# Patient Record
Sex: FEMALE | Race: WHITE | NOT HISPANIC OR LATINO
[De-identification: names, ages, dates, MRNs, and addresses within clinical notes are randomized per-mention and may not be internally consistent; named-entity substitution may affect disease eponyms.]

---

## 2019-05-30 PROBLEM — Z00.00 ENCOUNTER FOR PREVENTIVE HEALTH EXAMINATION: Status: ACTIVE | Noted: 2019-05-30

## 2019-06-12 ENCOUNTER — APPOINTMENT (OUTPATIENT)
Dept: NEUROLOGY | Facility: CLINIC | Age: 69
End: 2019-06-12

## 2020-10-21 ENCOUNTER — APPOINTMENT (OUTPATIENT)
Dept: VASCULAR SURGERY | Facility: CLINIC | Age: 70
End: 2020-10-21

## 2022-09-10 ENCOUNTER — APPOINTMENT (OUTPATIENT)
Dept: AFTER HOURS CARE | Facility: EMERGENCY ROOM | Age: 72
End: 2022-09-10

## 2022-09-10 DIAGNOSIS — R51.9 HEADACHE, UNSPECIFIED: ICD-10-CM

## 2022-09-10 PROCEDURE — 99204 OFFICE O/P NEW MOD 45 MIN: CPT | Mod: NC,95

## 2022-09-10 NOTE — PLAN
[No new medications perscribed] : Treat in place: No new medications prescribed [By Private Vehicle] : Sent to Emergency Department by private vehicle [FreeTextEntry1] : 72yoF p/w headache, neck pain, and nausea x1 week\par -patient instructed to go to ER for further evaluation given headache with neck pain and nausea--informed that there may be concern for vascular or infectious etiology, although musculoskeletal etiology is also possible\par -patient offered an ambulance for transport but refused--patient will self-transport\par -attempted to call [facility] to give warm hand-off, but unable to connect with ED physician

## 2022-09-10 NOTE — ASSESSMENT
[FreeTextEntry1] : 72yoF p/w headache, neck pain, and nausea x1 week\par -patient instructed to go to ER for further evaluation given headache with neck pain and nausea--informed that there may be concern for vascular or infectious etiology, although musculoskeletal etiology is also possible\par -patient offered an ambulance for transport but refused--patient will self-transport\par -attempted to call [facility] to give warm hand-off, but unable to connect with ED physician

## 2022-09-10 NOTE — REVIEW OF SYSTEMS
[Fatigue] : fatigue [Headache] : headache [Dizziness] : dizziness [Negative] : Gastrointestinal [Fever] : no fever [Chills] : no chills [FreeTextEntry9] : neck pain

## 2022-09-10 NOTE — PHYSICAL EXAM
[No Acute Distress] : no acute distress [Well-Appearing] : well-appearing [Normal Sclera/Conjunctiva] : normal sclera/conjunctiva [EOMI] : extraocular movements intact [No Respiratory Distress] : no respiratory distress  [No Focal Deficits] : no focal deficits [de-identified] : EXAM: NAD, EOMI,CN 2-12 intact, speaking in full sentences, no dysarthria or aphasia.

## 2022-09-10 NOTE — HISTORY OF PRESENT ILLNESS
[Home] : at home, [unfilled] , at the time of the visit. [Other Location: e.g. Home (Enter Location, City,State)___] : at [unfilled] [Verbal consent obtained from patient] : the patient, [unfilled] [FreeTextEntry8] : 72yoF; with PMH signif for Herniated Cervical Discs; now p/w headache, neck pain and nausea. patient reports about 1 week ago, she began doing some upper arm exercises, but just bicep and tricep curls.  denies any heavy lifting. reports about 6 days ago, she had sudden headache--over the occiput and neck, radiating diffusely through head, with nausea. reports headache has mildly improved, but nausea is persistent and she noticed she feels some unsteady gait.  denies f/c/s. denies sick contacts. denies travel denies numbness/tingling. denies focal weakness. denies cp/sob/palp. denies abd pain. denies any insect bites.\par PMH: Herniated Discs\par SOCIAL: non-smoker\par

## 2024-05-31 ENCOUNTER — NON-APPOINTMENT (OUTPATIENT)
Age: 74
End: 2024-05-31

## 2025-08-08 ENCOUNTER — RESULT REVIEW (OUTPATIENT)
Age: 75
End: 2025-08-08